# Patient Record
Sex: MALE | Race: WHITE | NOT HISPANIC OR LATINO | Employment: FULL TIME | URBAN - METROPOLITAN AREA
[De-identification: names, ages, dates, MRNs, and addresses within clinical notes are randomized per-mention and may not be internally consistent; named-entity substitution may affect disease eponyms.]

---

## 2019-07-06 ENCOUNTER — HOSPITAL ENCOUNTER (OUTPATIENT)
Dept: RADIOLOGY | Facility: MEDICAL CENTER | Age: 29
End: 2019-07-06

## 2019-07-06 ENCOUNTER — HOSPITAL ENCOUNTER (INPATIENT)
Facility: MEDICAL CENTER | Age: 29
LOS: 1 days | DRG: 700 | End: 2019-07-07
Attending: EMERGENCY MEDICINE | Admitting: SURGERY
Payer: COMMERCIAL

## 2019-07-06 ENCOUNTER — APPOINTMENT (OUTPATIENT)
Dept: RADIOLOGY | Facility: MEDICAL CENTER | Age: 29
DRG: 700 | End: 2019-07-06
Attending: EMERGENCY MEDICINE
Payer: COMMERCIAL

## 2019-07-06 ENCOUNTER — APPOINTMENT (OUTPATIENT)
Dept: RADIOLOGY | Facility: MEDICAL CENTER | Age: 29
DRG: 700 | End: 2019-07-06
Payer: COMMERCIAL

## 2019-07-06 DIAGNOSIS — R10.9 ACUTE LEFT FLANK PAIN: ICD-10-CM

## 2019-07-06 DIAGNOSIS — S37.002A: ICD-10-CM

## 2019-07-06 DIAGNOSIS — S37.039A: ICD-10-CM

## 2019-07-06 DIAGNOSIS — N17.9 AKI (ACUTE KIDNEY INJURY) (HCC): ICD-10-CM

## 2019-07-06 DIAGNOSIS — T14.90XA BLUNT TRAUMA: ICD-10-CM

## 2019-07-06 DIAGNOSIS — E86.0 DEHYDRATION: ICD-10-CM

## 2019-07-06 PROBLEM — Z53.09 CONTRAINDICATION TO DEEP VEIN THROMBOSIS (DVT) PROPHYLAXIS: Status: ACTIVE | Noted: 2019-07-06

## 2019-07-06 LAB
ABO + RH BLD: NORMAL
ABO GROUP BLD: NORMAL
ALBUMIN SERPL BCP-MCNC: 4 G/DL (ref 3.2–4.9)
ALBUMIN SERPL BCP-MCNC: 4.8 G/DL (ref 3.2–4.9)
ALBUMIN/GLOB SERPL: 2 G/DL
ALBUMIN/GLOB SERPL: 2.1 G/DL
ALP SERPL-CCNC: 59 U/L (ref 30–99)
ALP SERPL-CCNC: 61 U/L (ref 30–99)
ALT SERPL-CCNC: 11 U/L (ref 2–50)
ALT SERPL-CCNC: 13 U/L (ref 2–50)
ANION GAP SERPL CALC-SCNC: 12 MMOL/L (ref 0–11.9)
ANION GAP SERPL CALC-SCNC: 8 MMOL/L (ref 0–11.9)
APTT PPP: 25.9 SEC (ref 24.7–36)
AST SERPL-CCNC: 18 U/L (ref 12–45)
AST SERPL-CCNC: 35 U/L (ref 12–45)
BASOPHILS # BLD AUTO: 0.3 % (ref 0–1.8)
BASOPHILS # BLD: 0.02 K/UL (ref 0–0.12)
BILIRUB SERPL-MCNC: 0.8 MG/DL (ref 0.1–1.5)
BILIRUB SERPL-MCNC: 0.8 MG/DL (ref 0.1–1.5)
BLD GP AB SCN SERPL QL: NORMAL
BUN SERPL-MCNC: 19 MG/DL (ref 8–22)
BUN SERPL-MCNC: 20 MG/DL (ref 8–22)
CALCIUM SERPL-MCNC: 8.5 MG/DL (ref 8.5–10.5)
CALCIUM SERPL-MCNC: 8.8 MG/DL (ref 8.5–10.5)
CHLORIDE SERPL-SCNC: 104 MMOL/L (ref 96–112)
CHLORIDE SERPL-SCNC: 109 MMOL/L (ref 96–112)
CO2 SERPL-SCNC: 19 MMOL/L (ref 20–33)
CO2 SERPL-SCNC: 21 MMOL/L (ref 20–33)
CREAT SERPL-MCNC: 1.59 MG/DL (ref 0.5–1.4)
CREAT SERPL-MCNC: 1.77 MG/DL (ref 0.5–1.4)
EOSINOPHIL # BLD AUTO: 0.06 K/UL (ref 0–0.51)
EOSINOPHIL NFR BLD: 0.9 % (ref 0–6.9)
ERYTHROCYTE [DISTWIDTH] IN BLOOD BY AUTOMATED COUNT: 38.5 FL (ref 35.9–50)
ERYTHROCYTE [DISTWIDTH] IN BLOOD BY AUTOMATED COUNT: 38.5 FL (ref 35.9–50)
ETHANOL BLD-MCNC: 0 G/DL
GLOBULIN SER CALC-MCNC: 2 G/DL (ref 1.9–3.5)
GLOBULIN SER CALC-MCNC: 2.3 G/DL (ref 1.9–3.5)
GLUCOSE BLD-MCNC: 88 MG/DL (ref 65–99)
GLUCOSE SERPL-MCNC: 120 MG/DL (ref 65–99)
GLUCOSE SERPL-MCNC: 97 MG/DL (ref 65–99)
HCG SERPL QL: NEGATIVE
HCT VFR BLD AUTO: 39.7 % (ref 42–52)
HCT VFR BLD AUTO: 40 % (ref 42–52)
HCT VFR BLD AUTO: 43.8 % (ref 42–52)
HGB BLD-MCNC: 12.6 G/DL (ref 14–18)
HGB BLD-MCNC: 12.9 G/DL (ref 14–18)
HGB BLD-MCNC: 12.9 G/DL (ref 14–18)
HGB BLD-MCNC: 13.2 G/DL (ref 14–18)
HGB BLD-MCNC: 13.7 G/DL (ref 14–18)
HGB BLD-MCNC: 14.6 G/DL (ref 14–18)
IMM GRANULOCYTES # BLD AUTO: 0.01 K/UL (ref 0–0.11)
IMM GRANULOCYTES NFR BLD AUTO: 0.1 % (ref 0–0.9)
INR PPP: 0.94 (ref 0.87–1.13)
LYMPHOCYTES # BLD AUTO: 1.26 K/UL (ref 1–4.8)
LYMPHOCYTES NFR BLD: 18 % (ref 22–41)
MCH RBC QN AUTO: 28.8 PG (ref 27–33)
MCH RBC QN AUTO: 29 PG (ref 27–33)
MCHC RBC AUTO-ENTMCNC: 33 G/DL (ref 33.7–35.3)
MCHC RBC AUTO-ENTMCNC: 33.3 G/DL (ref 33.7–35.3)
MCV RBC AUTO: 86.9 FL (ref 81.4–97.8)
MCV RBC AUTO: 87.1 FL (ref 81.4–97.8)
MONOCYTES # BLD AUTO: 0.59 K/UL (ref 0–0.85)
MONOCYTES NFR BLD AUTO: 8.4 % (ref 0–13.4)
NEUTROPHILS # BLD AUTO: 5.07 K/UL (ref 1.82–7.42)
NEUTROPHILS NFR BLD: 72.3 % (ref 44–72)
NRBC # BLD AUTO: 0 K/UL
NRBC BLD-RTO: 0 /100 WBC
PLATELET # BLD AUTO: 174 K/UL (ref 164–446)
PLATELET # BLD AUTO: 201 K/UL (ref 164–446)
PMV BLD AUTO: 9.6 FL (ref 9–12.9)
PMV BLD AUTO: 9.6 FL (ref 9–12.9)
POTASSIUM SERPL-SCNC: 3.9 MMOL/L (ref 3.6–5.5)
POTASSIUM SERPL-SCNC: 5.8 MMOL/L (ref 3.6–5.5)
PROT SERPL-MCNC: 6 G/DL (ref 6–8.2)
PROT SERPL-MCNC: 7.1 G/DL (ref 6–8.2)
PROTHROMBIN TIME: 12.8 SEC (ref 12–14.6)
RBC # BLD AUTO: 4.59 M/UL (ref 4.7–6.1)
RBC # BLD AUTO: 5.04 M/UL (ref 4.7–6.1)
RH BLD: NORMAL
SODIUM SERPL-SCNC: 135 MMOL/L (ref 135–145)
SODIUM SERPL-SCNC: 138 MMOL/L (ref 135–145)
WBC # BLD AUTO: 12.1 K/UL (ref 4.8–10.8)
WBC # BLD AUTO: 7 K/UL (ref 4.8–10.8)

## 2019-07-06 PROCEDURE — 700105 HCHG RX REV CODE 258: Performed by: SURGERY

## 2019-07-06 PROCEDURE — 82962 GLUCOSE BLOOD TEST: CPT

## 2019-07-06 PROCEDURE — 80307 DRUG TEST PRSMV CHEM ANLYZR: CPT

## 2019-07-06 PROCEDURE — 99291 CRITICAL CARE FIRST HOUR: CPT

## 2019-07-06 PROCEDURE — 99291 CRITICAL CARE FIRST HOUR: CPT | Performed by: SURGERY

## 2019-07-06 PROCEDURE — 85610 PROTHROMBIN TIME: CPT

## 2019-07-06 PROCEDURE — 700105 HCHG RX REV CODE 258

## 2019-07-06 PROCEDURE — 305948 HCHG GREEN TRAUMA ACT PRE-NOTIFY NO CC

## 2019-07-06 PROCEDURE — 76705 ECHO EXAM OF ABDOMEN: CPT

## 2019-07-06 PROCEDURE — 700105 HCHG RX REV CODE 258: Performed by: NURSE PRACTITIONER

## 2019-07-06 PROCEDURE — G0390 TRAUMA RESPONS W/HOSP CRITI: HCPCS

## 2019-07-06 PROCEDURE — 86901 BLOOD TYPING SEROLOGIC RH(D): CPT

## 2019-07-06 PROCEDURE — 700111 HCHG RX REV CODE 636 W/ 250 OVERRIDE (IP): Performed by: NURSE PRACTITIONER

## 2019-07-06 PROCEDURE — 700102 HCHG RX REV CODE 250 W/ 637 OVERRIDE(OP): Performed by: NURSE PRACTITIONER

## 2019-07-06 PROCEDURE — 85025 COMPLETE CBC W/AUTO DIFF WBC: CPT

## 2019-07-06 PROCEDURE — 85014 HEMATOCRIT: CPT

## 2019-07-06 PROCEDURE — 700105 HCHG RX REV CODE 258: Performed by: EMERGENCY MEDICINE

## 2019-07-06 PROCEDURE — 86850 RBC ANTIBODY SCREEN: CPT

## 2019-07-06 PROCEDURE — 770006 HCHG ROOM/CARE - MED/SURG/GYN SEMI*

## 2019-07-06 PROCEDURE — 86900 BLOOD TYPING SEROLOGIC ABO: CPT

## 2019-07-06 PROCEDURE — 85730 THROMBOPLASTIN TIME PARTIAL: CPT

## 2019-07-06 PROCEDURE — 80053 COMPREHEN METABOLIC PANEL: CPT

## 2019-07-06 PROCEDURE — A9270 NON-COVERED ITEM OR SERVICE: HCPCS | Performed by: NURSE PRACTITIONER

## 2019-07-06 PROCEDURE — 84703 CHORIONIC GONADOTROPIN ASSAY: CPT

## 2019-07-06 PROCEDURE — 85027 COMPLETE CBC AUTOMATED: CPT

## 2019-07-06 PROCEDURE — 85018 HEMOGLOBIN: CPT

## 2019-07-06 RX ORDER — ONDANSETRON 2 MG/ML
4 INJECTION INTRAMUSCULAR; INTRAVENOUS EVERY 4 HOURS PRN
Status: DISCONTINUED | OUTPATIENT
Start: 2019-07-06 | End: 2019-07-07 | Stop reason: HOSPADM

## 2019-07-06 RX ORDER — ACETAMINOPHEN 500 MG
1000 TABLET ORAL EVERY 6 HOURS
Status: DISCONTINUED | OUTPATIENT
Start: 2019-07-06 | End: 2019-07-07 | Stop reason: HOSPADM

## 2019-07-06 RX ORDER — SODIUM CHLORIDE, SODIUM LACTATE, POTASSIUM CHLORIDE, CALCIUM CHLORIDE 600; 310; 30; 20 MG/100ML; MG/100ML; MG/100ML; MG/100ML
INJECTION, SOLUTION INTRAVENOUS CONTINUOUS
Status: DISCONTINUED | OUTPATIENT
Start: 2019-07-06 | End: 2019-07-07

## 2019-07-06 RX ORDER — AMOXICILLIN 250 MG
1 CAPSULE ORAL
Status: DISCONTINUED | OUTPATIENT
Start: 2019-07-06 | End: 2019-07-07 | Stop reason: HOSPADM

## 2019-07-06 RX ORDER — AMOXICILLIN 250 MG
1 CAPSULE ORAL NIGHTLY
Status: DISCONTINUED | OUTPATIENT
Start: 2019-07-06 | End: 2019-07-07 | Stop reason: HOSPADM

## 2019-07-06 RX ORDER — ENEMA 19; 7 G/133ML; G/133ML
1 ENEMA RECTAL
Status: DISCONTINUED | OUTPATIENT
Start: 2019-07-06 | End: 2019-07-07 | Stop reason: HOSPADM

## 2019-07-06 RX ORDER — DOCUSATE SODIUM 100 MG/1
100 CAPSULE, LIQUID FILLED ORAL 2 TIMES DAILY
Status: DISCONTINUED | OUTPATIENT
Start: 2019-07-06 | End: 2019-07-07 | Stop reason: HOSPADM

## 2019-07-06 RX ORDER — BISACODYL 10 MG
10 SUPPOSITORY, RECTAL RECTAL
Status: DISCONTINUED | OUTPATIENT
Start: 2019-07-06 | End: 2019-07-07 | Stop reason: HOSPADM

## 2019-07-06 RX ORDER — SODIUM CHLORIDE, SODIUM LACTATE, POTASSIUM CHLORIDE, CALCIUM CHLORIDE 600; 310; 30; 20 MG/100ML; MG/100ML; MG/100ML; MG/100ML
INJECTION, SOLUTION INTRAVENOUS
Status: COMPLETED | OUTPATIENT
Start: 2019-07-06 | End: 2019-07-06

## 2019-07-06 RX ORDER — OXYCODONE HYDROCHLORIDE 5 MG/1
5-10 TABLET ORAL
Status: DISCONTINUED | OUTPATIENT
Start: 2019-07-06 | End: 2019-07-07 | Stop reason: HOSPADM

## 2019-07-06 RX ORDER — POLYETHYLENE GLYCOL 3350 17 G/17G
1 POWDER, FOR SOLUTION ORAL 2 TIMES DAILY
Status: DISCONTINUED | OUTPATIENT
Start: 2019-07-06 | End: 2019-07-07 | Stop reason: HOSPADM

## 2019-07-06 RX ADMIN — SODIUM CHLORIDE, POTASSIUM CHLORIDE, SODIUM LACTATE AND CALCIUM CHLORIDE: 600; 310; 30; 20 INJECTION, SOLUTION INTRAVENOUS at 04:17

## 2019-07-06 RX ADMIN — ACETAMINOPHEN 1000 MG: 500 TABLET ORAL at 17:10

## 2019-07-06 RX ADMIN — OXYCODONE HYDROCHLORIDE 10 MG: 5 TABLET ORAL at 18:38

## 2019-07-06 RX ADMIN — FENTANYL CITRATE 50 MCG: 0.05 INJECTION, SOLUTION INTRAMUSCULAR; INTRAVENOUS at 17:38

## 2019-07-06 RX ADMIN — OXYCODONE HYDROCHLORIDE 10 MG: 5 TABLET ORAL at 15:44

## 2019-07-06 RX ADMIN — SODIUM CHLORIDE, POTASSIUM CHLORIDE, SODIUM LACTATE AND CALCIUM CHLORIDE: 600; 310; 30; 20 INJECTION, SOLUTION INTRAVENOUS at 01:58

## 2019-07-06 RX ADMIN — OXYCODONE HYDROCHLORIDE 10 MG: 5 TABLET ORAL at 09:18

## 2019-07-06 RX ADMIN — ACETAMINOPHEN 1000 MG: 500 TABLET ORAL at 05:29

## 2019-07-06 RX ADMIN — OXYCODONE HYDROCHLORIDE 10 MG: 5 TABLET ORAL at 12:26

## 2019-07-06 RX ADMIN — FENTANYL CITRATE 50 MCG: 50 INJECTION INTRAMUSCULAR; INTRAVENOUS at 05:29

## 2019-07-06 RX ADMIN — ACETAMINOPHEN 1000 MG: 500 TABLET ORAL at 12:26

## 2019-07-06 RX ADMIN — SODIUM CHLORIDE, POTASSIUM CHLORIDE, SODIUM LACTATE AND CALCIUM CHLORIDE 1000 ML: 600; 310; 30; 20 INJECTION, SOLUTION INTRAVENOUS at 01:57

## 2019-07-06 RX ADMIN — OXYCODONE HYDROCHLORIDE 10 MG: 5 TABLET ORAL at 21:54

## 2019-07-06 RX ADMIN — FENTANYL CITRATE 50 MCG: 50 INJECTION INTRAMUSCULAR; INTRAVENOUS at 02:41

## 2019-07-06 RX ADMIN — ACETAMINOPHEN 1000 MG: 500 TABLET ORAL at 02:45

## 2019-07-06 RX ADMIN — FENTANYL CITRATE 100 MCG: 50 INJECTION INTRAMUSCULAR; INTRAVENOUS at 06:29

## 2019-07-06 RX ADMIN — SODIUM CHLORIDE, POTASSIUM CHLORIDE, SODIUM LACTATE AND CALCIUM CHLORIDE: 600; 310; 30; 20 INJECTION, SOLUTION INTRAVENOUS at 17:49

## 2019-07-06 RX ADMIN — SODIUM CHLORIDE, POTASSIUM CHLORIDE, SODIUM LACTATE AND CALCIUM CHLORIDE: 600; 310; 30; 20 INJECTION, SOLUTION INTRAVENOUS at 02:46

## 2019-07-06 RX ADMIN — SENNOSIDES, DOCUSATE SODIUM 1 TABLET: 50; 8.6 TABLET, FILM COATED ORAL at 21:50

## 2019-07-06 RX ADMIN — OXYCODONE HYDROCHLORIDE 5 MG: 5 TABLET ORAL at 05:10

## 2019-07-06 ASSESSMENT — COGNITIVE AND FUNCTIONAL STATUS - GENERAL
DAILY ACTIVITIY SCORE: 24
SUGGESTED CMS G CODE MODIFIER MOBILITY: CH
SUGGESTED CMS G CODE MODIFIER DAILY ACTIVITY: CH
MOBILITY SCORE: 24

## 2019-07-06 ASSESSMENT — LIFESTYLE VARIABLES
EVER_SMOKED: NEVER
ALCOHOL_USE: NO

## 2019-07-06 ASSESSMENT — COPD QUESTIONNAIRES
DURING THE PAST 4 WEEKS HOW MUCH DID YOU FEEL SHORT OF BREATH: NONE/LITTLE OF THE TIME
COPD SCREENING SCORE: 0
DO YOU EVER COUGH UP ANY MUCUS OR PHLEGM?: NO/ONLY WITH OCCASIONAL COLDS OR INFECTIONS
HAVE YOU SMOKED AT LEAST 100 CIGARETTES IN YOUR ENTIRE LIFE: NO/DON'T KNOW

## 2019-07-06 ASSESSMENT — PATIENT HEALTH QUESTIONNAIRE - PHQ9
SUM OF ALL RESPONSES TO PHQ9 QUESTIONS 1 AND 2: 0
1. LITTLE INTEREST OR PLEASURE IN DOING THINGS: NOT AT ALL
2. FEELING DOWN, DEPRESSED, IRRITABLE, OR HOPELESS: NOT AT ALL

## 2019-07-06 NOTE — PROGRESS NOTES
Report received from Shira in ED. Patient transported to unit with ACLS RN and CCT, on monitor.     Patient arrived to [facility]. Patient walked from [facility] to bed. 2 RN skin check performed, no areas of concern at this time. CHG bath performed. Patient requested to remain in his clothes.     Weight:84.6kg  HR-66  158/93  Temp:98.3f    Pt and pt family updated about POC, will continue to monitor patient.

## 2019-07-06 NOTE — ED NOTES
Bedside report to Lianna ANDRADE. All questions answered. Pt transported off unit monitored. VSS. GCS 15.

## 2019-07-06 NOTE — PROGRESS NOTES
"Trauma Progress Note 7/6/2019 4:47 AM    This is a 28 y.o. male who was kicked by a horse in his left posterior flank. He sustained grade 3 kidney laceration on the left side. He was evaluated at [facility]. His creatinine there was 1.9.   Approximate resuscitation given to this point includes:  1 L crystalloid.    Plan:   - serial hemograms  - hydration and repeat BMP in AM    Assessment: alert and oriented. Hemodynamically stable. Pain is controlled.     /86   Pulse (!) 59   Temp 36.7 °C (98.1 °F) (Temporal)   Resp 15   Ht 1.854 m (6' 1\")   Wt 84.6 kg (186 lb 8.2 oz)   SpO2 94%   BMI 24.61 kg/m²     Hemoglobin: 13.7 g/dL    Urine Output: voided prior to admission / no hematuria    Recent Labs      07/06/19   0156   APTT  25.9   INR  0.94      Kidney laceration, initial encounter- (present on admission)   Assessment & Plan    Left grade 3 renal laceration   No hematuria / voiding   Creatinine 1.9 - IV hydration   Serial hemograms     Contraindication to deep vein thrombosis (DVT) prophylaxis- (present on admission)   Assessment & Plan    Initial systemic anticoagulation contraindicated secondary to elevated bleeding risk.   7/7 Surveillance venous duplex scanning ordered.     Trauma- (present on admission)   Assessment & Plan    Kicked by a horse.  Trauma Yellow Transfer Activation.  Serge Stahl DO. Trauma Surgery.         "

## 2019-07-06 NOTE — PROGRESS NOTES
Trauma / Surgical Daily Progress Note    Date of Service  7/6/2019    Chief Complaint  28 y.o. male admitted 7/6/2019 with Trauma    Interval Events  New admit - kicked by horse with severe kidney injury.  Acute nonoliguric renal failure - Cr significantly elevated, resuscitation ongoing  Serial labs  Pain well controlled    Review of Systems  Review of Systems     Vital Signs for last 24 hours  Temp:  [36.3 °C (97.3 °F)-36.8 °C (98.3 °F)] 36.6 °C (97.8 °F)  Pulse:  [45-96] 55  Resp:  [12-28] 18  BP: (142-148)/() 148/86  SpO2:  [93 %-100 %] 99 %    Hemodynamic parameters for last 24 hours       Respiratory Data     Respiration: 18, Pulse Oximetry: 99 %        RUL Breath Sounds: Clear, RML Breath Sounds: Clear, RLL Breath Sounds: Clear, LORA Breath Sounds: Clear, LLL Breath Sounds: Clear    Physical Exam  Physical Exam   Constitutional: He is oriented to person, place, and time. He appears well-developed and well-nourished. No distress.   HENT:   Head: Normocephalic and atraumatic.   Eyes: Pupils are equal, round, and reactive to light. EOM are normal.   Neck: Neck supple. No tracheal deviation present.   Cardiovascular: Normal rate and regular rhythm.    Pulmonary/Chest: Effort normal. No respiratory distress.   Abdominal: Soft. He exhibits no distension.   Genitourinary: Penis normal.   Musculoskeletal: Normal range of motion. He exhibits no edema or deformity.   Neurological: He is alert and oriented to person, place, and time.   Skin: Skin is warm and dry.   Psychiatric: He has a normal mood and affect. His behavior is normal.   Nursing note and vitals reviewed.      Laboratory  Recent Results (from the past 24 hour(s))   DIAGNOSTIC ALCOHOL    Collection Time: 07/06/19  1:56 AM   Result Value Ref Range    Diagnostic Alcohol 0.00 0.00 g/dL   CBC WITHOUT DIFFERENTIAL    Collection Time: 07/06/19  1:56 AM   Result Value Ref Range    WBC 12.1 (H) 4.8 - 10.8 K/uL    RBC 5.04 4.70 - 6.10 M/uL    Hemoglobin 14.6  14.0 - 18.0 g/dL    Hematocrit 43.8 42.0 - 52.0 %    MCV 86.9 81.4 - 97.8 fL    MCH 29.0 27.0 - 33.0 pg    MCHC 33.3 (L) 33.7 - 35.3 g/dL    RDW 38.5 35.9 - 50.0 fL    Platelet Count 201 164 - 446 K/uL    MPV 9.6 9.0 - 12.9 fL   Comp Metabolic Panel    Collection Time: 07/06/19  1:56 AM   Result Value Ref Range    Sodium 135 135 - 145 mmol/L    Potassium 5.8 (H) 3.6 - 5.5 mmol/L    Chloride 104 96 - 112 mmol/L    Co2 19 (L) 20 - 33 mmol/L    Anion Gap 12.0 (H) 0.0 - 11.9    Glucose 97 65 - 99 mg/dL    Bun 20 8 - 22 mg/dL    Creatinine 1.77 (H) 0.50 - 1.40 mg/dL    Calcium 8.8 8.5 - 10.5 mg/dL    AST(SGOT) 35 12 - 45 U/L    ALT(SGPT) 13 2 - 50 U/L    Alkaline Phosphatase 61 30 - 99 U/L    Total Bilirubin 0.8 0.1 - 1.5 mg/dL    Albumin 4.8 3.2 - 4.9 g/dL    Total Protein 7.1 6.0 - 8.2 g/dL    Globulin 2.3 1.9 - 3.5 g/dL    A-G Ratio 2.1 g/dL   Prothrombin Time    Collection Time: 07/06/19  1:56 AM   Result Value Ref Range    PT 12.8 12.0 - 14.6 sec    INR 0.94 0.87 - 1.13   APTT    Collection Time: 07/06/19  1:56 AM   Result Value Ref Range    APTT 25.9 24.7 - 36.0 sec   HCG QUAL SERUM    Collection Time: 07/06/19  1:56 AM   Result Value Ref Range    Beta-Hcg Qualitative Serum Negative Negative   COD - Adult (Type and Screen)    Collection Time: 07/06/19  1:56 AM   Result Value Ref Range    ABO Grouping Only O     Rh Grouping Only POS     Antibody Screen-Cod NEG    ESTIMATED GFR    Collection Time: 07/06/19  1:56 AM   Result Value Ref Range    GFR If  55 (A) >60 mL/min/1.73 m 2    GFR If Non  46 (A) >60 mL/min/1.73 m 2   ABO Rh Confirm    Collection Time: 07/06/19  2:55 AM   Result Value Ref Range    ABO Rh Confirm O POS    Hemoglobin - STAT    Collection Time: 07/06/19  2:55 AM   Result Value Ref Range    Hemoglobin 12.6 (L) 14.0 - 18.0 g/dL   HGB    Collection Time: 07/06/19  4:48 AM   Result Value Ref Range    Hemoglobin 13.7 (L) 14.0 - 18.0 g/dL   HCT    Collection Time: 07/06/19   4:48 AM   Result Value Ref Range    Hematocrit 39.7 (L) 42.0 - 52.0 %   ACCU-CHEK GLUCOSE    Collection Time: 07/06/19  5:35 AM   Result Value Ref Range    Glucose - Accu-Ck 88 65 - 99 mg/dL   CBC with Differential: Tomorrow AM    Collection Time: 07/06/19  9:20 AM   Result Value Ref Range    WBC 7.0 4.8 - 10.8 K/uL    RBC 4.59 (L) 4.70 - 6.10 M/uL    Hemoglobin 13.2 (L) 14.0 - 18.0 g/dL    Hematocrit 40.0 (L) 42.0 - 52.0 %    MCV 87.1 81.4 - 97.8 fL    MCH 28.8 27.0 - 33.0 pg    MCHC 33.0 (L) 33.7 - 35.3 g/dL    RDW 38.5 35.9 - 50.0 fL    Platelet Count 174 164 - 446 K/uL    MPV 9.6 9.0 - 12.9 fL    Neutrophils-Polys 72.30 (H) 44.00 - 72.00 %    Lymphocytes 18.00 (L) 22.00 - 41.00 %    Monocytes 8.40 0.00 - 13.40 %    Eosinophils 0.90 0.00 - 6.90 %    Basophils 0.30 0.00 - 1.80 %    Immature Granulocytes 0.10 0.00 - 0.90 %    Nucleated RBC 0.00 /100 WBC    Neutrophils (Absolute) 5.07 1.82 - 7.42 K/uL    Lymphs (Absolute) 1.26 1.00 - 4.80 K/uL    Monos (Absolute) 0.59 0.00 - 0.85 K/uL    Eos (Absolute) 0.06 0.00 - 0.51 K/uL    Baso (Absolute) 0.02 0.00 - 0.12 K/uL    Immature Granulocytes (abs) 0.01 0.00 - 0.11 K/uL    NRBC (Absolute) 0.00 K/uL   Comp Metabolic Panel (CMP): Tomorrow AM    Collection Time: 07/06/19  9:20 AM   Result Value Ref Range    Sodium 138 135 - 145 mmol/L    Potassium 3.9 3.6 - 5.5 mmol/L    Chloride 109 96 - 112 mmol/L    Co2 21 20 - 33 mmol/L    Anion Gap 8.0 0.0 - 11.9    Glucose 120 (H) 65 - 99 mg/dL    Bun 19 8 - 22 mg/dL    Creatinine 1.59 (H) 0.50 - 1.40 mg/dL    Calcium 8.5 8.5 - 10.5 mg/dL    AST(SGOT) 18 12 - 45 U/L    ALT(SGPT) 11 2 - 50 U/L    Alkaline Phosphatase 59 30 - 99 U/L    Total Bilirubin 0.8 0.1 - 1.5 mg/dL    Albumin 4.0 3.2 - 4.9 g/dL    Total Protein 6.0 6.0 - 8.2 g/dL    Globulin 2.0 1.9 - 3.5 g/dL    A-G Ratio 2.0 g/dL   ESTIMATED GFR    Collection Time: 07/06/19  9:20 AM   Result Value Ref Range    GFR If African American >60 >60 mL/min/1.73 m 2    GFR If Non   52 (A) >60 mL/min/1.73 m 2   Hemoglobin - Q6 hours x4    Collection Time: 07/06/19  2:45 PM   Result Value Ref Range    Hemoglobin 12.9 (L) 14.0 - 18.0 g/dL       Fluids    Intake/Output Summary (Last 24 hours) at 07/06/19 1629  Last data filed at 07/06/19 1600   Gross per 24 hour   Intake          1884.12 ml   Output             1350 ml   Net           534.12 ml       Core Measures & Quality Metrics  Labs reviewed, Medications reviewed and Radiology images reviewed  Calero catheter: No Calero      DVT Prophylaxis: Contraindicated - High bleeding risk  DVT prophylaxis - mechanical: SCDs          MIGUEL Score  ETOH Screening    Assessment/Plan  Kidney laceration, initial encounter- (present on admission)   Assessment & Plan    Left grade 3 renal laceration   No hematuria / voiding   Creatinine 1.9 - IV hydration   Serial hemograms      Contraindication to deep vein thrombosis (DVT) prophylaxis- (present on admission)   Assessment & Plan    Initial systemic anticoagulation contraindicated secondary to elevated bleeding risk.   7/7 Surveillance venous duplex scanning ordered.      Trauma- (present on admission)   Assessment & Plan    Kicked by a horse.  Trauma Yellow Transfer Activation.  Serge Stahl DO. Trauma Surgery.       Plan:  Ongoing resuscitation  Serial h/h  Serial BMP, trend Cr and UOP      Discussed patient condition with RN, RT and Pharmacy.  The patient is/remains critically ill with acute renal failure, severe solid organ injury.Patient is at high risk for decompensation with the threat of imminent deterioration, life threatening deterioration, and loss of vital organ function.    I provided the following critical care services: resuscitation, serial examination and labs.    Critical care time spent exclusive of procedures: 40 minutes.    Wilian Gay MD  [phone]

## 2019-07-06 NOTE — ASSESSMENT & PLAN NOTE
Left grade 3 renal laceration   No hematuria / voiding   Creatinine 1.9 - IV hydration   Serial hemograms

## 2019-07-06 NOTE — ED NOTES
Assumed care of pt at this time, pt alert, oriented x4. Transfer from [facility], pt reports he was kicked by horse on left side 2 pm yesterday, states he was intialy ok, then later in the day had extreme pain shooting down into groin. Pt has swelling to left flank and mild redness. Has no other complaints. Pain currently 4/10, pt received 100mcg fentanyl in route by EMS. Pt wife at bedside. Pt awaiting inpt room.

## 2019-07-06 NOTE — PROGRESS NOTES
Hgb redrawn, for 2 point drop in one hour. Jacklyn VAZQUEZ updated about follow up hgb of 13.7. No new orders given.

## 2019-07-06 NOTE — CARE PLAN
Problem: Venous Thromboembolism (VTW)/Deep Vein Thrombosis (DVT) Prevention:  Goal: Patient will participate in Venous Thrombosis (VTE)/Deep Vein Thrombosis (DVT)Prevention Measures  Outcome: PROGRESSING AS EXPECTED  Pt utilizing SCDs .    Problem: Pain Management  Goal: Pain level will decrease to patient's comfort goal  Outcome: PROGRESSING AS EXPECTED  Pt's pain will decrease to comfort goal through rest, repositioning, a quiet environment, and PRN pharmacologic analgesia.

## 2019-07-06 NOTE — ASSESSMENT & PLAN NOTE
Initial systemic anticoagulation contraindicated secondary to elevated bleeding risk.   7/7 Surveillance venous duplex scanning ordered.

## 2019-07-06 NOTE — PROGRESS NOTES
"TRAUMA TERTIARY SURVEY     Mental status adequate for full examination?: Yes    Spine cleared (radiologically and/or clinically): Yes    PHYSICAL EXAMINATION:  Vitals: /86   Pulse (!) 54   Temp 36.4 °C (97.5 °F) (Temporal)   Resp 18   Ht 1.854 m (6' 1\")   Wt 84.6 kg (186 lb 8.2 oz)   SpO2 98%   BMI 24.61 kg/m²   Constitutional:     General Appearance: appears stated age, is in no apparent distress.  HEENT:     No significant external craniofacial trauma. The pupils are equal, round, and reactive to light bilaterally. The extraocular muscles are intact bilaterally. The nares and oropharynx are clear. The midface and jaw are stable. No malocclusion is evident.  Neck:    The cervical spine is supple and non tender. Normal range of motion.  Respiratory:   Inspection: Unlabored respirations, no intercostal retractions, paradoxical motion, or accessory muscle use.   Palpation:  The chest is nontender. The clavicles are non deformed bilaterally.   Auscultation: normal, clear to auscultation.  Cardiovascular:   Auscultation: normal and regular rate and rhythm.  Abdomen:   Abdomen is soft, nontender, without organomegaly or masses.  Musculoskeletal:   The pelvis is stable.  No significant angulation, deformity, or soft tissue injury involving the upper and lower extremities. Normal range of motion.     Tenderness to palpation of left flank.  Back:   The thoracolumbar spine was examined. Examination is remarkable for no significant tenderness, swelling, or deformity in the thoracolumbar region.  Skin:   The skin is warm and dry.  Neurologic:    Lorman Coma Scale (GCS) 15. Neurologic examination revealed no focal deficits noted, mental status intact.  Psychiatric:   The patient does not appear depressed or anxious.    IMAGING:  US-ABDOMEN F.A.S.T. LTD (FOR ED USE ONLY)   Final Result      1.  No abdominal free fluid identified.   2.  Partially visualized left perinephric hematoma and suspected left renal " laceration, better seen on prior CT.      OUTSIDE IMAGES-CT ABDOMEN /PELVIS   Final Result      OUTSIDE IMAGES-DX CHEST   Final Result        All current laboratory studies/radiology exams reviewed: Yes    Completed Consultations:  N/A     Pending Consultations:  N/A    Newly Identified Diagnoses and Injuries:  None identified    Total Score: 0      ETOH Screening     Assessment complete date: 7/6/2019

## 2019-07-06 NOTE — H&P
"TRAUMA HISTORY AND PHYSICAL    CHIEF COMPLAINT: Kidney injury.     HISTORY OF PRESENT ILLNESS: The patient is a 28 year-old White man who was injured When he was kicked by a horse.  Patient complains of abdominal pain occasionally radiating to the left groin.  He has no hematuria.  He has no nausea or vomiting.  He has no shortness of breath cough or sputum production.  He denies being struck on his had a losing consciousness.  Denies neck or back pain.  Denies extremity pain.    TRIAGE CATEGORY: The patient was triaged as a Trauma Yellow Transfer activation. The patient was initially evaluated at [facility] in [place], [place] where preliminary work up demonstrated Kidney injury. The patient was transported to [facility] in [place], [place] for a definitive trauma evaluation. An expeditious primary and secondary survey with required adjuncts was conducted. See Trauma Narrator for full details.    PAST MEDICAL HISTORY:  has a past medical history of SVT (supraventricular tachycardia) (HCC).     PAST SURGICAL HISTORY:  has a past surgical history that includes other cardiac surgery.    ALLERGIES:   Allergies   Allergen Reactions   • Codeine        CURRENT MEDICATIONS:    Home Medications    **Home medications have not yet been reviewed for this encounter**       FAMILY HISTORY: family history is not on file.    SOCIAL HISTORY:      REVIEW OF SYSTEMS:Left flank and rib pain with abdominal pain and nausea but no vomiting.  Otherwise, Comprehensive review of systems is negative with the exception of the aforementioned HPI, PMH, and PSH bullets in accordance with CMS guidelines    PHYSICAL EXAMINATION:     CONSTITUTIONAL:     Vital Signs: /86   Pulse 69   Temp 36.7 °C (98 °F)   Resp 12   Ht 1.854 m (6' 1\")   Wt 81.6 kg (180 lb)   SpO2 98%    General Appearance: appears stated age.  HEENT:     No significant external craniofacial trauma. The pupils are equal, round, and reactive " to light bilaterally. The extraocular muscles are intact bilaterally. . No malocclusion is evident.  NECK:    The cervical spine is supple and non tender.  RESPIRATORY:   Inspection: Unlabored respirations, no intercostal retractions, paradoxical motion, or accessory muscle use.   Palpation:  The chest is nontender. The clavicles are non deformed bilaterally.   Auscultation: normal.  CARDIOVASCULAR:   Auscultation: normal.   Peripheral Pulses: Normal.   ABDOMEN:   Tenderness without rebound or guarding.  Tenderness extends around the left flank.  There is no ecchymosis.  GENITOURINARY:   No testicular swelling  MUSCULOSKELETAL:   The pelvis is stable. No significant angulation, deformity, or soft tissue injury involving the upper and lower extremities.  BACK:   The thoracolumbar spine was examined utilizing spinal motion restriction. Examination is remarkable for no significant tenderness, swelling, or deformity in the thoracolumbar region.  SKIN:    The skin is warm and well purfused.  NEUROLOGIC:    Bedford Coma Scale (GCS) 15. Neurologic examination revealed no focal deficits noted.      LABORATORY VALUES:   Recent Labs      07/06/19 0156 07/06/19   0255   WBC  12.1*   --    RBC  5.04   --    HEMOGLOBIN  14.6  12.6*   HEMATOCRIT  43.8   --    MCV  86.9   --    MCH  29.0   --    MCHC  33.3*   --    RDW  38.5   --    PLATELETCT  201   --    MPV  9.6   --      Recent Labs      07/06/19 0156   SODIUM  135   POTASSIUM  5.8*   CHLORIDE  104   CO2  19*   GLUCOSE  97   BUN  20   CREATININE  1.77*   CALCIUM  8.8     Recent Labs      07/06/19 0156   ASTSGOT  35   ALTSGPT  13   TBILIRUBIN  0.8   ALKPHOSPHAT  61   GLOBULIN  2.3   INR  0.94     Recent Labs      07/06/19 0156   APTT  25.9   INR  0.94        IMAGING:   US-ABDOMEN F.A.S.T. LTD (FOR ED USE ONLY)   Final Result      1.  No abdominal free fluid identified.   2.  Partially visualized left perinephric hematoma and suspected left renal laceration, better seen  on prior CT.      OUTSIDE IMAGES-CT ABDOMEN /PELVIS   Final Result: Grade 3 kidney injury on the left side without extravasation      OUTSIDE IMAGES-DX CHEST   Final Result          ACTIVE PROBLEMS:     Trauma  Kicked by a horse.  Trauma Yellow Transfer Activation.  Serge Stahl DO. Trauma Surgery.    Kidney laceration, initial encounter  Left grade 2 renal laceration   No hematuria / voiding   Creatinine 1.9 - IV hydration   Serial hemograms      ASSESSMENT AND PLAN:  28-year-old  status post incident rodeo where he was kept by a horse.  Found to have a grade 3 kidney injury in the left side without active internal hemorrhage.  Patient was transferred for definitive trauma care.  He is hemodynamically stable.  I will observe the patient in the ICU this morning with serial hemoglobins and serial abdominal examinations.  If he is doing well he may be transferred to the surgical floor later today.  If there is any sign of decompensation patient may benefit from angiography and embolization however I think this is unlikely.    Tertiary survey.    DISPOSITION: Trauma ICU.    ____________________________________   Serge Stahl D.O.    DD: 7/6/2019  3:59 AM

## 2019-07-06 NOTE — ED PROVIDER NOTES
"ED PROVIDER NOTE     Scribed for Jesus Bonner M.D. by Isak Toussaint. 7/6/2019, 1:47 AM.    CHIEF COMPLAINT  Trauma Yellow - Blunt Trauma    HPI    Primary care provider: None noted  Means of arrival: Ambulance  History obtained from: Patient and EMS  History limited by: None    Inturn Oralia is a 28 y.o. male who presents to the ED as a [place] [facility] [place] a blunt trauma that occurred at 3 PM today. He states he was outside when a horse kicked him directly in the left flank. The patient reports onset of left flank pain described as \"sharp\" in quality immediately following the accident and states it radiates into his testicle. He reports an isolated associated episode of hematuria, but 2 following episodes of urination have had no hematuria. The patient received 200 mcg of Fentanyl and 2 mg of Morphine by EMS prior to arrival for alleviation of his pain which significantly helped.  His pain is worsened by palpation and twisting.  At worst it is severe, at present minimal.  He is additionally noted to have received 4 mg of Morphine prior to leaving [facility]. The patient has no complaints of chest pain or shortness of breath at this time. He has no long term health conditions and does not take any daily medications.     REVIEW OF SYSTEMS  Constitutional: Positive for blunt trauma, negative for fevers.  Respiratory: Negative for shortness of breath.    Cardiovascular: Negative for chest pain.  Genitourinary: Positive for left flank pain and hematuria.   All other systems reviewed and are negative.    PAST MEDICAL HISTORY   has a past medical history of SVT (supraventricular tachycardia) (HCC).SVT    PAST FAMILY HISTORY  No pertinent family history noted.    SOCIAL HISTORY  None noted    SURGICAL HISTORY  Cardiac Ablation    CURRENT MEDICATIONS  No daily medications    ALLERGIES  Allergies   Allergen Reactions   • Codeine  " "      PHYSICAL EXAM  VITAL SIGNS: /86   Pulse 82   Temp 36.7 °C (98 °F)   Resp 17   Ht 1.854 m (6' 1\")   Wt 81.6 kg (180 lb)   SpO2 99%   BMI 23.75 kg/m²    Pulse ox interpretation: On room air, I interpret this pulse ox as normal.  Constitutional: Well-developed, well-nourished.  Lying on stretcher in mild distress.  HEENT: Normocephalic, atraumatic. No skull depressions. Posterior pharynx clear, mucous membranes moist. No gross hemotympanum. No gannon signs or raccoon eyes. No intraoral bleeding or injuries.   Eyes: Pupils are 2 mm and symmetric. EOMI. Normal sclerae.  Neck: Supple, nontender.  Chest/Pulmonary: Clear to ausculation bilaterally, no wheezes or rhonchi. No chest wall crepitus.   Cardiovascular: Regular rate and rhythm, no murmur. 2+ symmetric radial pulses.   Abdomen: Soft, nontender; no rebound, guarding, or masses.  : Normal external male genitalia. No significant tenderness or swelling. Normal cremasteric reflex. No blood at the meatus.   Back: No C, T, or L spine step offs. Left CVA tenderness with no signs of bruising.   Musculoskeletal: No long bone deformities or edema.  Neuro: GCS of 15. Clear speech, normal coordination, cranial nerves II-XII grossly intact, no focal asymmetry or sensory deficits.   Psych: Normal mood and affect.  Skin: No rashes, warm and dry.    DIAGNOSTIC STUDIES / PROCEDURES    LABS & EKG  Results for orders placed or performed during the hospital encounter of 07/06/19   DIAGNOSTIC ALCOHOL   Result Value Ref Range    Diagnostic Alcohol 0.00 0.00 g/dL   CBC WITHOUT DIFFERENTIAL   Result Value Ref Range    WBC 12.1 (H) 4.8 - 10.8 K/uL    RBC 5.04 4.70 - 6.10 M/uL    Hemoglobin 14.6 14.0 - 18.0 g/dL    Hematocrit 43.8 42.0 - 52.0 %    MCV 86.9 81.4 - 97.8 fL    MCH 29.0 27.0 - 33.0 pg    MCHC 33.3 (L) 33.7 - 35.3 g/dL    RDW 38.5 35.9 - 50.0 fL    Platelet Count 201 164 - 446 K/uL    MPV 9.6 9.0 - 12.9 fL   Comp Metabolic Panel   Result Value Ref Range    " Sodium 135 135 - 145 mmol/L    Potassium 5.8 (H) 3.6 - 5.5 mmol/L    Chloride 104 96 - 112 mmol/L    Co2 19 (L) 20 - 33 mmol/L    Anion Gap 12.0 (H) 0.0 - 11.9    Glucose 97 65 - 99 mg/dL    Bun 20 8 - 22 mg/dL    Creatinine 1.77 (H) 0.50 - 1.40 mg/dL    Calcium 8.8 8.5 - 10.5 mg/dL    AST(SGOT) 35 12 - 45 U/L    ALT(SGPT) 13 2 - 50 U/L    Alkaline Phosphatase 61 30 - 99 U/L    Total Bilirubin 0.8 0.1 - 1.5 mg/dL    Albumin 4.8 3.2 - 4.9 g/dL    Total Protein 7.1 6.0 - 8.2 g/dL    Globulin 2.3 1.9 - 3.5 g/dL    A-G Ratio 2.1 g/dL   Prothrombin Time   Result Value Ref Range    PT 12.8 12.0 - 14.6 sec    INR 0.94 0.87 - 1.13   APTT   Result Value Ref Range    APTT 25.9 24.7 - 36.0 sec   HCG QUAL SERUM   Result Value Ref Range    Beta-Hcg Qualitative Serum Negative Negative   COD - Adult (Type and Screen)   Result Value Ref Range    ABO Grouping Only O     Rh Grouping Only POS     Antibody Screen-Cod NEG    ABO Rh Confirm   Result Value Ref Range    ABO Rh Confirm O POS    Hemoglobin - STAT   Result Value Ref Range    Hemoglobin 12.6 (L) 14.0 - 18.0 g/dL   ESTIMATED GFR   Result Value Ref Range    GFR If  55 (A) >60 mL/min/1.73 m 2    GFR If Non  46 (A) >60 mL/min/1.73 m 2   HGB   Result Value Ref Range    Hemoglobin 13.7 (L) 14.0 - 18.0 g/dL   HCT   Result Value Ref Range    Hematocrit 39.7 (L) 42.0 - 52.0 %   CBC with Differential: Tomorrow AM   Result Value Ref Range    WBC 7.0 4.8 - 10.8 K/uL    RBC 4.59 (L) 4.70 - 6.10 M/uL    Hemoglobin 13.2 (L) 14.0 - 18.0 g/dL    Hematocrit 40.0 (L) 42.0 - 52.0 %    MCV 87.1 81.4 - 97.8 fL    MCH 28.8 27.0 - 33.0 pg    MCHC 33.0 (L) 33.7 - 35.3 g/dL    RDW 38.5 35.9 - 50.0 fL    Platelet Count 174 164 - 446 K/uL    MPV 9.6 9.0 - 12.9 fL    Neutrophils-Polys 72.30 (H) 44.00 - 72.00 %    Lymphocytes 18.00 (L) 22.00 - 41.00 %    Monocytes 8.40 0.00 - 13.40 %    Eosinophils 0.90 0.00 - 6.90 %    Basophils 0.30 0.00 - 1.80 %    Immature Granulocytes  0.10 0.00 - 0.90 %    Nucleated RBC 0.00 /100 WBC    Neutrophils (Absolute) 5.07 1.82 - 7.42 K/uL    Lymphs (Absolute) 1.26 1.00 - 4.80 K/uL    Monos (Absolute) 0.59 0.00 - 0.85 K/uL    Eos (Absolute) 0.06 0.00 - 0.51 K/uL    Baso (Absolute) 0.02 0.00 - 0.12 K/uL    Immature Granulocytes (abs) 0.01 0.00 - 0.11 K/uL    NRBC (Absolute) 0.00 K/uL   Comp Metabolic Panel (CMP): Tomorrow AM   Result Value Ref Range    Sodium 138 135 - 145 mmol/L    Potassium 3.9 3.6 - 5.5 mmol/L    Chloride 109 96 - 112 mmol/L    Co2 21 20 - 33 mmol/L    Anion Gap 8.0 0.0 - 11.9    Glucose 120 (H) 65 - 99 mg/dL    Bun 19 8 - 22 mg/dL    Creatinine 1.59 (H) 0.50 - 1.40 mg/dL    Calcium 8.5 8.5 - 10.5 mg/dL    AST(SGOT) 18 12 - 45 U/L    ALT(SGPT) 11 2 - 50 U/L    Alkaline Phosphatase 59 30 - 99 U/L    Total Bilirubin 0.8 0.1 - 1.5 mg/dL    Albumin 4.0 3.2 - 4.9 g/dL    Total Protein 6.0 6.0 - 8.2 g/dL    Globulin 2.0 1.9 - 3.5 g/dL    A-G Ratio 2.0 g/dL   ESTIMATED GFR   Result Value Ref Range    GFR If African American >60 >60 mL/min/1.73 m 2    GFR If Non African American 52 (A) >60 mL/min/1.73 m 2   Hemoglobin - Q6 hours x4   Result Value Ref Range    Hemoglobin 12.9 (L) 14.0 - 18.0 g/dL   ACCU-CHEK GLUCOSE   Result Value Ref Range    Glucose - Accu-Ck 88 65 - 99 mg/dL     All labs reviewed by me.    RADIOLOGY  US-ABDOMEN F.A.S.T. LTD (FOR ED USE ONLY)   Final Result      1.  No abdominal free fluid identified.   2.  Partially visualized left perinephric hematoma and suspected left renal laceration, better seen on prior CT.      OUTSIDE IMAGES-CT ABDOMEN /PELVIS   Final Result      OUTSIDE IMAGES-DX CHEST   Final Result        The radiologist's interpretations of all radiological studies have been reviewed by me.        COURSE & MEDICAL DECISION MAKING    This is a 28 y.o. male who presents with direct blow blunt trauma after being kicked by a horse, noted to have a renal laceration and outside imaging transferred here for higher level  of trauma care.    Differential Diagnosis includes but is not limited to:  Blunt trauma, renal injury, ureteral injury, fracture, contusion    ED Course:  1:47 AM Patient seen and evaluated acutely in trauma bay. Patient will be treated with LR bolus for concerns of renal injury he must be kept n.p.o. in case he develops a surgical process and his creatinine was elevated on outside labs and I would not like him to get dehydrated. Ordered for US-Abdomen F.A.S.T., Diagnostic Alcohol, CBC w/o differential, CMP, PTT, APTT, HCG Qual, Estimated GFR, COD, and ABO Rh confirm to evaluate his symptoms.     1:51 AM - Ultrasound tech at patient's bedside. Imaging of patient's abdomen shows trace perinephric fluid in the left flank.    1:52 AM - Dr. Morillo (Trauma) at patient's bedside. He will kindly admit the patient to his service.      Repeat labs still show a slightly elevated creatinine, so we will continue IV fluids.  He is tolerating crystalloid bolus well thus I feel he is having a positive response to parenteral rehydration.  Pain well controlled prior to transfer to trauma ICU for close monitoring overnight.  Outside CT imaging reviewed, appears to be grade 2/3 renal laceration.  The patient is nontoxic-appearing highly doubt perforation or uro-peritoneum at this time.    Medications   Respiratory Care per Protocol (not administered)   Pharmacy Consult Request ...Pain Management Review 1 Each (not administered)   docusate sodium (COLACE) capsule 100 mg (100 mg Oral Refused 7/6/19 0600)   senna-docusate (PERICOLACE or SENOKOT S) 8.6-50 MG per tablet 1 Tab (1 Tab Oral Refused 7/6/19 0230)   senna-docusate (PERICOLACE or SENOKOT S) 8.6-50 MG per tablet 1 Tab (not administered)   polyethylene glycol/lytes (MIRALAX) PACKET 1 Packet (1 Packet Oral Refused 7/6/19 0600)   magnesium hydroxide (MILK OF MAGNESIA) suspension 30 mL (30 mL Oral Refused 7/6/19 0600)   bisacodyl (DULCOLAX) suppository 10 mg (not administered)    fleet enema 133 mL (not administered)   LR infusion ( Intravenous Rate Change 7/6/19 1001)   acetaminophen (TYLENOL) tablet 1,000 mg (1,000 mg Oral Given 7/6/19 1226)   oxyCODONE immediate-release (ROXICODONE) tablet 5-10 mg (10 mg Oral Given 7/6/19 1544)   ondansetron (ZOFRAN) syringe/vial injection 4 mg (not administered)   fentaNYL (SUBLIMAZE) injection 50 mcg (not administered)   lactated ringers infusion (BOLUS) ( Intravenous Rate Verify 7/6/19 0230)     FINAL IMPRESSION  1. Blunt trauma    2. Closed injury of left kidney, initial encounter    3. MILO (acute kidney injury) (HCC)    4. Dehydration    5. Acute left flank pain        -ADMIT-     Pertinent Labs & Imaging studies reviewed and verified by myself, as well as nursing notes and the patient's past medical, family, and social histories (See chart for details).    Results, exam findings, clinical impression, presumed diagnosis, treatment options, and plan for admission were discussed with the patient, and they verbalized understanding, agreed with, and appreciated the plan of care.    Isak MONTESINOS (Umberto), am scribing for, and in the presence of, Jesus Bonner M.D..    Electronically signed by: Isak Toussaint (Umberto), 7/6/2019    Jesus MONTESINOS M.D. personally performed the services described in this documentation, as scribed by Isak Toussaint in my presence, and it is both accurate and complete.    C.    Portions of this record were made with voice recognition software.  Despite my review, spelling/grammar/context errors may still remain.  Interpretation of this chart should be taken in this context.    The note accurately reflects work and decisions made by me.  Jesus Bonner  7/6/2019  4:20 PM

## 2019-07-06 NOTE — PROGRESS NOTES
· 2 RN skin check complete with Татьяна  · Devices in place PIVs. SCDs, BP cuff, pulse ox probe,      No areas of concern noted.  Left flank bruising noted.     · Skin assessed under devices C/D/I.  · Confirmed pressure ulcers found on N/A   ·   · New potential pressure ulcers noted on N/A  · Wound consult placed? N/A   · Photo uploaded? N/A   · The following interventions are in place Pt turns self and repositions appropriately, moisturizer provided, and education provided about repositioning and skin care.

## 2019-07-06 NOTE — ED NOTES
Inturn Thirty-Six  28 y.o. male  Chief Complaint   Patient presents with   • Trauma Green     Pt was kicked by a horse around 1400 7/5/19, VSS. Scans PTA showed solid organ injury. Flank pain noted. GCS 15.        Pt is alert and oriented, speaking in full sentences, follows commands and responds appropriately to questions. Resp are even and unlabored. No behavioral indicators of pain.

## 2019-07-06 NOTE — PROGRESS NOTES
IDT AM rounds at bedside to discuss POC. Updated on H/H, urine output, labs, vitals, and overnight events. Decrease IVF, advance diet, okay to transfer to floor.

## 2019-07-06 NOTE — CARE PLAN
Problem: Knowledge Deficit  Goal: Knowledge of disease process/condition, treatment plan, diagnostic tests, and medications will improve    Intervention: Explain information regarding disease process/condition, treatment plan, diagnostic tests, and medications and document in education  Educated on need to do follow up H&Hs. Educated on bleeding, and monitoring. Educated on vitals and ICU plan of care.       Problem: Pain Management  Goal: Pain level will decrease to patient's comfort goal    Intervention: Follow pain managment plan developed in collaboration with patient and Interdisciplinary Team  Educated patient on pain control, pain management, and pain medications. Patient educated to update bedside RN, if pain begins to increase. Encouraging patient to rest and reposition and use pain medications when those non-pharmacological do not work.

## 2019-07-07 VITALS
SYSTOLIC BLOOD PRESSURE: 128 MMHG | HEIGHT: 73 IN | BODY MASS INDEX: 24.72 KG/M2 | RESPIRATION RATE: 18 BRPM | DIASTOLIC BLOOD PRESSURE: 88 MMHG | HEART RATE: 67 BPM | OXYGEN SATURATION: 100 % | TEMPERATURE: 98.3 F | WEIGHT: 186.51 LBS

## 2019-07-07 LAB
ANION GAP SERPL CALC-SCNC: 5 MMOL/L (ref 0–11.9)
BASOPHILS # BLD AUTO: 0.3 % (ref 0–1.8)
BASOPHILS # BLD: 0.02 K/UL (ref 0–0.12)
BUN SERPL-MCNC: 15 MG/DL (ref 8–22)
CALCIUM SERPL-MCNC: 9.2 MG/DL (ref 8.5–10.5)
CHLORIDE SERPL-SCNC: 107 MMOL/L (ref 96–112)
CO2 SERPL-SCNC: 25 MMOL/L (ref 20–33)
CREAT SERPL-MCNC: 1.43 MG/DL (ref 0.5–1.4)
EOSINOPHIL # BLD AUTO: 0.12 K/UL (ref 0–0.51)
EOSINOPHIL NFR BLD: 2 % (ref 0–6.9)
ERYTHROCYTE [DISTWIDTH] IN BLOOD BY AUTOMATED COUNT: 38.9 FL (ref 35.9–50)
GLUCOSE SERPL-MCNC: 111 MG/DL (ref 65–99)
HCT VFR BLD AUTO: 39 % (ref 42–52)
HGB BLD-MCNC: 13.2 G/DL (ref 14–18)
IMM GRANULOCYTES # BLD AUTO: 0.01 K/UL (ref 0–0.11)
IMM GRANULOCYTES NFR BLD AUTO: 0.2 % (ref 0–0.9)
LYMPHOCYTES # BLD AUTO: 1.34 K/UL (ref 1–4.8)
LYMPHOCYTES NFR BLD: 22.4 % (ref 22–41)
MCH RBC QN AUTO: 29.8 PG (ref 27–33)
MCHC RBC AUTO-ENTMCNC: 33.8 G/DL (ref 33.7–35.3)
MCV RBC AUTO: 88 FL (ref 81.4–97.8)
MONOCYTES # BLD AUTO: 0.48 K/UL (ref 0–0.85)
MONOCYTES NFR BLD AUTO: 8 % (ref 0–13.4)
NEUTROPHILS # BLD AUTO: 4.01 K/UL (ref 1.82–7.42)
NEUTROPHILS NFR BLD: 67.1 % (ref 44–72)
NRBC # BLD AUTO: 0 K/UL
NRBC BLD-RTO: 0 /100 WBC
PLATELET # BLD AUTO: 171 K/UL (ref 164–446)
PMV BLD AUTO: 9.8 FL (ref 9–12.9)
POTASSIUM SERPL-SCNC: 3.9 MMOL/L (ref 3.6–5.5)
RBC # BLD AUTO: 4.43 M/UL (ref 4.7–6.1)
SODIUM SERPL-SCNC: 137 MMOL/L (ref 135–145)
WBC # BLD AUTO: 6 K/UL (ref 4.8–10.8)

## 2019-07-07 PROCEDURE — 700102 HCHG RX REV CODE 250 W/ 637 OVERRIDE(OP): Performed by: NURSE PRACTITIONER

## 2019-07-07 PROCEDURE — 80048 BASIC METABOLIC PNL TOTAL CA: CPT

## 2019-07-07 PROCEDURE — 85025 COMPLETE CBC W/AUTO DIFF WBC: CPT

## 2019-07-07 PROCEDURE — 700112 HCHG RX REV CODE 229: Performed by: NURSE PRACTITIONER

## 2019-07-07 PROCEDURE — A9270 NON-COVERED ITEM OR SERVICE: HCPCS | Performed by: NURSE PRACTITIONER

## 2019-07-07 PROCEDURE — 700105 HCHG RX REV CODE 258: Performed by: SURGERY

## 2019-07-07 PROCEDURE — 36415 COLL VENOUS BLD VENIPUNCTURE: CPT

## 2019-07-07 RX ORDER — OXYCODONE HYDROCHLORIDE 5 MG/1
5 TABLET ORAL EVERY 6 HOURS PRN
Qty: 20 TAB | Refills: 0 | Status: SHIPPED | OUTPATIENT
Start: 2019-07-07 | End: 2019-07-14

## 2019-07-07 RX ADMIN — ACETAMINOPHEN 1000 MG: 500 TABLET ORAL at 06:07

## 2019-07-07 RX ADMIN — DOCUSATE SODIUM 100 MG: 100 CAPSULE, LIQUID FILLED ORAL at 06:07

## 2019-07-07 RX ADMIN — OXYCODONE HYDROCHLORIDE 10 MG: 5 TABLET ORAL at 10:39

## 2019-07-07 RX ADMIN — SODIUM CHLORIDE, POTASSIUM CHLORIDE, SODIUM LACTATE AND CALCIUM CHLORIDE: 600; 310; 30; 20 INJECTION, SOLUTION INTRAVENOUS at 00:17

## 2019-07-07 RX ADMIN — ACETAMINOPHEN 1000 MG: 500 TABLET ORAL at 00:15

## 2019-07-07 RX ADMIN — OXYCODONE HYDROCHLORIDE 10 MG: 5 TABLET ORAL at 02:51

## 2019-07-07 RX ADMIN — OXYCODONE HYDROCHLORIDE 10 MG: 5 TABLET ORAL at 06:07

## 2019-07-07 ASSESSMENT — ENCOUNTER SYMPTOMS
RESPIRATORY NEGATIVE: 1
PSYCHIATRIC NEGATIVE: 1
CONSTITUTIONAL NEGATIVE: 1
MYALGIAS: 1
NEUROLOGICAL NEGATIVE: 1

## 2019-07-07 NOTE — PROGRESS NOTES
Trauma / Surgical Daily Progress Note    Date of Service  7/7/2019    Chief Complaint  28 y.o. male admitted 7/6/2019 as a trauma yellow transfer - Kicked by horse - Grade 3 kidney lac    Interval Events  Hgb stable  Creatinine trend down  Tolerating diet  Ambulating several times throughout day     Lives in [place] with family, good support  Would like to go home.     Review of Systems  Review of Systems   Constitutional: Negative.    HENT: Negative.    Respiratory: Negative.    Genitourinary: Negative.  Negative for hematuria.   Musculoskeletal: Positive for myalgias.   Neurological: Negative.    Psychiatric/Behavioral: Negative.    All other systems reviewed and are negative.       Vital Signs  Temp:  [36.4 °C (97.5 °F)-37.1 °C (98.8 °F)] 36.8 °C (98.3 °F)  Pulse:  [45-85] 67  Resp:  [16-18] 18  BP: (128-156)/(71-97) 128/88  SpO2:  [94 %-100 %] 100 %    Physical Exam  Physical Exam   Constitutional: He is oriented to person, place, and time. He appears well-developed and well-nourished. No distress.   Pulmonary/Chest: Effort normal. No respiratory distress.   Abdominal: Soft. There is no tenderness.   Musculoskeletal:   Moves all extremities    Neurological: He is alert and oriented to person, place, and time.   Skin:   Bruising left flank   Nursing note and vitals reviewed.      Laboratory  Recent Results (from the past 24 hour(s))   Hemoglobin - Q6 hours x4    Collection Time: 07/06/19  2:45 PM   Result Value Ref Range    Hemoglobin 12.9 (L) 14.0 - 18.0 g/dL   Hemoglobin - Q6 hours x4    Collection Time: 07/06/19  9:56 PM   Result Value Ref Range    Hemoglobin 12.9 (L) 14.0 - 18.0 g/dL   CBC WITH DIFFERENTIAL    Collection Time: 07/07/19  2:59 AM   Result Value Ref Range    WBC 6.0 4.8 - 10.8 K/uL    RBC 4.43 (L) 4.70 - 6.10 M/uL    Hemoglobin 13.2 (L) 14.0 - 18.0 g/dL    Hematocrit 39.0 (L) 42.0 - 52.0 %    MCV 88.0 81.4 - 97.8 fL    MCH 29.8 27.0 - 33.0 pg    MCHC 33.8 33.7 - 35.3 g/dL    RDW 38.9 35.9 -  50.0 fL    Platelet Count 171 164 - 446 K/uL    MPV 9.8 9.0 - 12.9 fL    Neutrophils-Polys 67.10 44.00 - 72.00 %    Lymphocytes 22.40 22.00 - 41.00 %    Monocytes 8.00 0.00 - 13.40 %    Eosinophils 2.00 0.00 - 6.90 %    Basophils 0.30 0.00 - 1.80 %    Immature Granulocytes 0.20 0.00 - 0.90 %    Nucleated RBC 0.00 /100 WBC    Neutrophils (Absolute) 4.01 1.82 - 7.42 K/uL    Lymphs (Absolute) 1.34 1.00 - 4.80 K/uL    Monos (Absolute) 0.48 0.00 - 0.85 K/uL    Eos (Absolute) 0.12 0.00 - 0.51 K/uL    Baso (Absolute) 0.02 0.00 - 0.12 K/uL    Immature Granulocytes (abs) 0.01 0.00 - 0.11 K/uL    NRBC (Absolute) 0.00 K/uL   Basic Metabolic Panel    Collection Time: 07/07/19  2:59 AM   Result Value Ref Range    Sodium 137 135 - 145 mmol/L    Potassium 3.9 3.6 - 5.5 mmol/L    Chloride 107 96 - 112 mmol/L    Co2 25 20 - 33 mmol/L    Glucose 111 (H) 65 - 99 mg/dL    Bun 15 8 - 22 mg/dL    Creatinine 1.43 (H) 0.50 - 1.40 mg/dL    Calcium 9.2 8.5 - 10.5 mg/dL    Anion Gap 5.0 0.0 - 11.9   ESTIMATED GFR    Collection Time: 07/07/19  2:59 AM   Result Value Ref Range    GFR If African American >60 >60 mL/min/1.73 m 2    GFR If Non African American 59 (A) >60 mL/min/1.73 m 2       Fluids    Intake/Output Summary (Last 24 hours) at 07/07/19 1024  Last data filed at 07/07/19 0400   Gross per 24 hour   Intake          1763.12 ml   Output             1850 ml   Net           -86.88 ml       Core Measures & Quality Metrics  Labs reviewed, Medications reviewed and Radiology images reviewed  Calero catheter: No Calero      DVT Prophylaxis: Contraindicated - High bleeding risk  DVT prophylaxis - mechanical: SCDs  Ulcer prophylaxis: Not indicated    Assessed for rehab: Patient returned to prior level of function, rehabilitation not indicated at this time    Total Score: 2    ETOH Screening     Assessment complete date: 7/6/2019 (Negative screening )        Assessment/Plan  Kidney laceration, initial encounter- (present on admission)   Assessment &  Plan    Left grade 3 renal laceration   No hematuria / voiding   Creatinine 1.9 - IV hydration   Serial hemograms      Contraindication to deep vein thrombosis (DVT) prophylaxis- (present on admission)   Assessment & Plan    Initial systemic anticoagulation contraindicated secondary to elevated bleeding risk.   7/7 Surveillance venous duplex scanning ordered.      Trauma- (present on admission)   Assessment & Plan    Kicked by a horse.  Trauma Yellow Transfer Activation.  Serge Stahl DO. Trauma Surgery.     Discussed patient condition with Family, RN, Patient and trauma surgery. Dr. Stahl

## 2019-07-07 NOTE — CARE PLAN
Problem: Safety  Goal: Will remain free from falls  Outcome: PROGRESSING AS EXPECTED    Intervention: Assess risk factors for falls   07/07/19 0245   OTHER   Fall Risk Risk to Fall - 0 - 1 point   Risk for Injury-Any positive answers results in the pt being at high risk for fall related injury Not Applicable   Mobility Status Assessment 0-Ambulates & Transfers Independently. No Assistance Required   History of fall 0   Pt Calls for Assistance Yes;No assistance required     Intervention: Implement fall precautions   07/06/19 2000 07/07/19 0245   OTHER   Environmental Precautions Treaded Slipper Socks on Patient;Personal Belongings, Wastebasket, Call Bell etc. in Easy Reach;Report Given to Other Health Care Providers Regarding Fall Risk;Bed in Low Position;Communication Sign for Patients & Families;Mobility Assessed & Appropriate Sign Placed --    IV Pole on Same Side of Bed as Bathroom --  Yes   Bedrails --  Bedrails Closest to Bathroom Down         Problem: Venous Thromboembolism (VTW)/Deep Vein Thrombosis (DVT) Prevention:  Goal: Patient will participate in Venous Thrombosis (VTE)/Deep Vein Thrombosis (DVT)Prevention Measures  Outcome: PROGRESSING AS EXPECTED   07/07/19 0000   Mechanical/VTE Prophylaxis   Mechanical Prophylaxis  SCDs, Sequential Compression Device   SCDs, Sequential Compression Device On       Problem: Bowel/Gastric:  Goal: Normal bowel function is maintained or improved  Outcome: PROGRESSING AS EXPECTED  Pt educated on the side effect of constipation to taking narcotics. Pt understands and agrees to take stool softener this evening.

## 2019-07-07 NOTE — CARE PLAN
Problem: Safety  Goal: Will remain free from injury  Outcome: PROGRESSING AS EXPECTED  Standard falls precautions in place.     Problem: Pain Management  Goal: Pain level will decrease to patient's comfort goal  Outcome: PROGRESSING AS EXPECTED  Pt aware of available pain medication and when it is due.

## 2019-07-07 NOTE — PROGRESS NOTES
"Pt A&O x4. Wife present at bedside.     Vitals: /76   Pulse 85   Temp 37.1 °C (98.8 °F) (Temporal)   Resp 16   Ht 1.854 m (6' 1\")   Wt 84.6 kg (186 lb 8.2 oz)   SpO2 96%   BMI 24.61 kg/m²     Pt rates pain 7 out of 10. Medicated for pain. Declines heat/ ice pack.     Neuro: WALL. Denies new onset of numbness/ tingling.    Cardiac: Denies new onset of chest pain.    Vascular: Pulses 2+ BUE, BLE. No edema noted.    Respiratory: Lungs sound clear to auscultation. On RA. Denies SOB.    GI: Abdomen soft, tender. + bowel sounds, + flatus, - BM today. On regular diet, tolerating well. - nausea/ vomiting.    : Pt voiding adequately. Urine yellow/ clear.      MSK: Pt up to bathroom self, no assistance required, tolerating well. Pt ambulated halls today.     Integumentary: Left abdominal/ flank bruise noted, small.    Labs noted.    Fall precautions in place: Bed locked in lowest position, Upper bed rails up, treaded socks in place, personal belongings within reach, call light within reach, appropriate mobility signs in place, - bed alarm. Pt calls appropriately.     Pt updated on POC.   "

## 2019-07-07 NOTE — PROGRESS NOTES
Pt okay for discharge. Prescriptions given to pt and his wife by Yu VAZQUEZ. Discharge instructions reviewed without any further questions. IV was removed with catheter tip intact. Pt ambulated to private vehicle with his wife.

## 2019-07-07 NOTE — PROGRESS NOTES
Pt arrived to unit via wheelchair and was settled into room T404-2. Pt is AOx4, pleasant with care. VSS. Reports minimal pain. Pt is able to ambulate independently. Pt encouraged to void in the urinal for accurate output. IV patent and fluids restarted. Pt's wife provided him dinner. No further needs at this time.

## 2019-07-07 NOTE — DISCHARGE INSTRUCTIONS
Discharge Instructions    Discharged to home by car with relative. Discharged via walking, hospital escort: Yes.  Special equipment needed: Not Applicable    Be sure to schedule a follow-up appointment with your primary care doctor or any specialists as instructed.     Discharge Plan:   Diet Plan: Discussed  Activity Level: Discussed  Confirmed Follow up Appointment: Patient to Call and Schedule Appointment  Confirmed Symptoms Management: Discussed  Medication Reconciliation Updated: Yes  Influenza Vaccine Indication: Patient Refuses    I understand that a diet low in cholesterol, fat, and sodium is recommended for good health. Unless I have been given specific instructions below for another diet, I accept this instruction as my diet prescription.   Other diet: regular    Special Instructions: None    · Is patient discharged on Warfarin / Coumadin?   No     Depression / Suicide Risk    As you are discharged from this [facility] facility, it is important to learn how to keep safe from harming yourself.    Recognize the warning signs:  · Abrupt changes in personality, positive or negative- including increase in energy   · Giving away possessions  · Change in eating patterns- significant weight changes-  positive or negative  · Change in sleeping patterns- unable to sleep or sleeping all the time   · Unwillingness or inability to communicate  · Depression  · Unusual sadness, discouragement and loneliness  · Talk of wanting to die  · Neglect of personal appearance   · Rebelliousness- reckless behavior  · Withdrawal from people/activities they love  · Confusion- inability to concentrate     If you or a loved one observes any of these behaviors or has concerns about self-harm, here's what you can do:  · Talk about it- your feelings and reasons for harming yourself  · Remove any means that you might use to hurt yourself (examples: pills, rope, extension cords, firearm)  · Get professional help from the community (Mental  Health, Substance Abuse, psychological counseling)  · Do not be alone:Call your Safe Contact- someone whom you trust who will be there for you.  · Call your local CRISIS HOTLINE 611-0215 or 499-707-2246  · Call your local Children's Mobile Crisis Response Team [place] (459) 552-6182 or www.[employer]  · Call the toll free National Suicide Prevention Hotlines   · National Suicide Prevention Lifeline 453-576-DWLQ (1624)  · National Hope Line Network 800-SUICIDE (322-2642)